# Patient Record
Sex: FEMALE
[De-identification: names, ages, dates, MRNs, and addresses within clinical notes are randomized per-mention and may not be internally consistent; named-entity substitution may affect disease eponyms.]

---

## 2017-07-19 ENCOUNTER — HOSPITAL ENCOUNTER (EMERGENCY)
Dept: HOSPITAL 42 - ED | Age: 52
Discharge: HOME | End: 2017-07-19
Payer: OTHER GOVERNMENT

## 2017-07-19 VITALS — SYSTOLIC BLOOD PRESSURE: 126 MMHG | HEART RATE: 76 BPM | OXYGEN SATURATION: 99 % | DIASTOLIC BLOOD PRESSURE: 77 MMHG

## 2017-07-19 VITALS — TEMPERATURE: 97.8 F | RESPIRATION RATE: 16 BRPM
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
## 2017-07-19 DIAGNOSIS — I86.8: ICD-10-CM

## 2017-07-19 DIAGNOSIS — S31.821A: Primary | ICD-10-CM
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
## 2017-07-19 NOTE — ED PDOC
Arrival/HPI





- General


Time Seen by Provider: 07/19/17 04:19


Historian: Patient





- History of Present Illness


Narrative History of Present Illness (Text): 





07/19/17 04:26


Paris Price is a 52 year old female who presents to the emergency 

department complaining of bleeding from ruptured vericose vein in upper left leg

/buttock region. States she was using the bathroom before going to bed, when 

the vericose vein ruptured. States this has happened before, but was able to 

control the bleeding on her own. However, reports that she was unable to stop 

bleeding today. Denies any fever, chills, headache, dizziness, chest pain, 

shortness of breath, nausea, vomiting, diarrhea, or any other complaints at 

this time. 





Time/Duration: Prior to Arrival


Symptom Onset: Sudden


Symptom Course: Unchanged


Activities at Onset: Light


Context: Home





Past Medical History





- Provider Review


Nursing Documentation Reviewed: Yes





Family/Social History





- Physician Review


Nursing Documentation Reviewed: Yes


Family/Social History: No Known Family HX





Allergies/Home Meds


Allergies/Adverse Reactions: 


Allergies





No Known Allergies Allergy (Verified 07/19/17 04:30)


 








Home Medications: 


 Home Meds











 Medication  Instructions  Recorded  Confirmed


 


No Known Home Med  07/19/17 07/19/17














Review of Systems





- Physician Review


All systems were reviewed & negative as marked: Yes





- Review of Systems


Constitutional: Normal.  absent: Fatigue, Fevers


Respiratory: Normal.  absent: SOB, Cough, Sputum


Cardiovascular: Normal.  absent: Chest Pain, Palpitations


Gastrointestinal: Normal.  absent: Abdominal Pain, Diarrhea, Nausea, Vomiting


Musculoskeletal: Other (bleeding ruptured vericose vein ).  absent: Arthralgias

, Back Pain


Skin: Normal.  absent: Rash, Pruritis


Neurological: Normal.  absent: Headache, Dizziness


Psychiatric: Normal





Physical Exam


Vital Signs Reviewed: Yes


Vital Signs











  Temp Pulse Resp BP Pulse Ox


 


 07/19/17 05:50   76  16  126/77  99


 


 07/19/17 03:58  97.8 F  82  16  130/77  97











Temperature: Afebrile


Blood Pressure: Normal


Pulse: Regular


Respiratory Rate: Normal


Appearance: Positive for: Well-Appearing, Non-Toxic, Comfortable


Pain Distress: None


Mental Status: Positive for: Alert and Oriented X 3





- Systems Exam


Head: Present: Atraumatic, Normocephalic


Pupils: Present: PERRL


Conjunctiva: Present: Normal


Mouth: Present: Moist Mucous Membranes


Respiratory/Chest: Present: Clear to Auscultation, Good Air Exchange.  No: 

Respiratory Distress, Accessory Muscle Use


Cardiovascular: Present: Regular Rate and Rhythm, Normal S1, S2.  No: Murmurs


Abdomen: Present: Normal Bowel Sounds.  No: Tenderness, Distention, Peritoneal 

Signs


Back: Present: Normal Inspection


Upper Extremity: Present: Normal Inspection.  No: Cyanosis, Edema


Lower Extremity: Present: NORMAL PULSES, Neurovascularly Intact, Other (

bleeding varices on rt buttock ).  No: Edema, CALF TENDERNESS


Neurological: Present: GCS=15, CN II-XII Intact, Speech Normal, Motor Func 

Grossly Intact, Normal Sensory Function


Skin: Present: Warm, Dry, Normal Color.  No: Rashes


Psychiatric: Present: Alert, Oriented x 3, Normal Insight, Normal Concentration





Medical Decision Making


ED Course and Treatment: 





07/19/17 04:38


Impression: A 52 year old female who presents to the emergency department 

complaining of bleeding from ruptured vericose vein. 








Plan:





-- Reassess and disposition








Progress Notes:





07/19/17 05:10


PROCEDURE: LACERATION REPAIR


Performed by the emergency provider


Location: Left Buttock 


Description: clean wound edges, no foreign bodies


Distal CMS: Normal. No deficits. Neurovascularly intact.


Anesthesia: Lidocaine 1%


Preparation: The area was prepped and draped in the usual sterile fashion.


Exploration: The wound was explored and no foreign bodies were found.


Procedure: The wound was closed with  5-0 prolene. There was good 

approximation. In total, 2 stitches were used.


Post-Procedure: Good closure and hemostasis. The patient tolerated the 

procedure well and there


were no complications. CSM remains intact. Post procedure dressing applied.











- Scribe Statement


The provider has reviewed the documentation as recorded by the Raphaele


Chris Grullon 


Provider Attestation: 





Provider Scribe Attestation:


All medical record entries made by the Scribheidi were at my direction and 

personally dictated by me. I have reviewed the chart and agree that the record 

accurately reflects my personal performance of the history, physical exam, 

medical decision making, and the department course for this patient. I have 

also personally directed, reviewed, and agree with the discharge instructions 

and disposition.








Disposition/Present on Arrival





- Present on Arrival


Any Indicators Present on Arrival: No





- Disposition


Have Diagnosis and Disposition been Completed?: Yes


Diagnosis: 


 Ruptured varicose vein, Laceration





Disposition: HOME/ ROUTINE


Disposition Time: 05:30


Condition: GOOD


Discharge Instructions (ExitCare):  Laceration (ED), Varicose Veins (ED)


Additional Instructions: 


suture removal in 7 days


Referrals: 


Victor Hugo Angeles DO [Primary Care Provider] - Follow up with primary

## 2017-07-31 ENCOUNTER — HOSPITAL ENCOUNTER (EMERGENCY)
Dept: HOSPITAL 42 - ED | Age: 52
LOS: 1 days | Discharge: HOME | End: 2017-08-01
Payer: OTHER GOVERNMENT

## 2017-07-31 VITALS
DIASTOLIC BLOOD PRESSURE: 62 MMHG | SYSTOLIC BLOOD PRESSURE: 134 MMHG | OXYGEN SATURATION: 95 % | RESPIRATION RATE: 17 BRPM | TEMPERATURE: 98.3 F | HEART RATE: 84 BPM
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
## 2017-07-31 DIAGNOSIS — S71.111A: Primary | ICD-10-CM

## 2017-08-01 NOTE — ED PDOC
Arrival/HPI





- General


Historian: Patient





<Vinicio Ny - Last Filed: 08/01/17 00:57>





<Eliecer Irene - Last Filed: 08/01/17 01:28>





- General


Chief Complaint: Lower Extremity Problem/Injury


Time Seen by Provider: 08/01/17 00:49





- History of Present Illness


Narrative History of Present Illness (Text): 





08/01/17 00:57


53 y/o female, last tetanus under 4 years ago, post menopausal, c/o skin tear x 

1 hour and need suture removal on the previous suture on the rt. thigh x 8 

days.  pt. stated that she had 2 sutures on the rt. thigh which suppose to come 

out today.  Pt. was sitting down tonight, bending and feel mild skin tear on 

the rt. thigh, started to oozing from the new site, no fever or chills, no 

headache or night sweat, no numbness or tingling, no other medical or 

psychological complaints. 


 (Vinicio Ny)





Past Medical History





- Provider Review


Nursing Documentation Reviewed: Yes





- Infectious Disease


Hx of Infectious Diseases: None





- Reproductive


Menopause: Yes





- Endocrine/Metabolic


Hx Hypothyroidism: Yes





- Psychiatric


Hx Substance Use: No





- Surgical History


Hx Tonsillectomy: Yes


Hx Tubal Ligation: Yes





<Vinicio Ny - Last Filed: 08/01/17 00:57>





Family/Social History





- Physician Review


Nursing Documentation Reviewed: Yes


Family/Social History: Unknown Family HX


Smoking Status: Never Smoked


Hx Alcohol Use: Yes


Frequency of alcohol use: Socially


Hx Substance Use: No





<Vinicio Ny - Last Filed: 08/01/17 00:57>





Allergies/Home Meds





<Vinicio Ny - Last Filed: 08/01/17 00:57>





<Eliecer Irene - Last Filed: 08/01/17 01:28>


Allergies/Adverse Reactions: 


Allergies





No Known Allergies Allergy (Verified 07/31/17 23:54)


 








Home Medications: 


 Home Meds











 Medication  Instructions  Recorded  Confirmed


 


No Known Home Med  07/19/17 07/31/17














Review of Systems





- Review of Systems


Constitutional: absent: Fatigue


Eyes: absent: Vision Changes


ENT: absent: Hearing Changes, Rhinorrhea


Respiratory: absent: SOB, Cough


Cardiovascular: absent: Chest Pain


Gastrointestinal: absent: Abdominal Pain, Nausea, Vomiting


Skin: absent: Rash, Pruritis, Skin Lesions


Neurological: absent: Headache, Dizziness





<Vinicio Ny - Last Filed: 08/01/17 00:57>





Physical Exam


Vital Signs Reviewed: Yes


Temperature: Afebrile


Blood Pressure: Normal


Pulse: Regular


Respiratory Rate: Normal


Appearance: Positive for: Well-Appearing, Non-Toxic, Comfortable


Pain Distress: None


Mental Status: Positive for: Alert and Oriented X 3





- Systems Exam


Head: Present: Atraumatic, Normocephalic


Pupils: Present: PERRL


Extroacular Muscles: Present: EOMI


Conjunctiva: Present: Normal


Mouth: Present: Moist Mucous Membranes


Neck: Present: Normal Range of Motion


Respiratory/Chest: Present: Clear to Auscultation, Good Air Exchange.  No: 

Respiratory Distress, Accessory Muscle Use


Cardiovascular: Present: Regular Rate and Rhythm, Normal S1, S2.  No: Murmurs


Abdomen: Present: Normal Bowel Sounds.  No: Tenderness, Distention, Peritoneal 

Signs


Back: Present: Normal Inspection


Upper Extremity: Present: Normal Inspection.  No: Cyanosis, Edema


Lower Extremity: Present: Normal Inspection, Other (there is visible 2 sutures 

with the previous varicose vein completely healed.  There is new mild skin very 

superficial tear less than 0.3cm noted with mild oozing, no cellulitis or 

streaking, no ulcers. ).  No: Edema


Neurological: Present: GCS=15, Speech Normal, Motor Func Grossly Intact, Gait 

Normal, Memory Normal


Skin: Present: Warm, Dry, Normal Color.  No: Rashes


Psychiatric: Present: Alert, Oriented x 3, Normal Insight, Normal Concentration





<Vinicio Ny - Last Filed: 08/01/17 00:57>





Medical Decision Making





<Vinicio Ny - Last Filed: 08/01/17 00:57>





<Eliecer Irene - Last Filed: 08/01/17 01:28>


ED Course and Treatment: 





08/01/17 01:06


-2 sutures removed as it is time to come out with no remaining suture


-new skin tear site irrigated with the normal saline, clean with betadine, 

dermabond and sterile strip with the wound completely stop bleeding.


-Discharge home with education on follow up with your own pmd within 2 days, 

avoid bending or squatting, return to the ER for any new or worsening signs or 

symptoms.  (Vinicio Ny)





- PA / NP / Resident Statement


MD/DO has reviewed & agrees with the documentation as recorded.





<Vinicio Ny - Last Filed: 08/01/17 00:57>





- PA / NP / Resident Statement


GURPREET has reviewed & agrees with the documentation as recorded.


GURPREET has examined the patient and agrees with the treatment plan.





<Eliecer Irene - Last Filed: 08/01/17 01:28>





Disposition/Present on Arrival





- Present on Arrival


Any Indicators Present on Arrival: No


History of DVT/PE: No


History of Uncontrolled Diabetes: No


Urinary Catheter: No


History of Decub. Ulcer: No


History Surgical Site Infection Following: None





- Disposition


Have Diagnosis and Disposition been Completed?: Yes


Disposition Time: 01:07


Patient Plan: Discharge





<Vinicio Ny - Last Filed: 08/01/17 00:57>





<Eliecer Irene - Last Filed: 08/01/17 01:28>





- Disposition


Diagnosis: 


 Skin tear





Disposition: HOME/ ROUTINE


Condition: GOOD


Additional Instructions: 


-Discharge home with education on follow up with your own pmd within 2 days, 

avoid bending or squatting, return to the ER for any new or worsening signs or 

symptoms. 


Referrals: 


[facility] [place] [Outside] - Follow up with primary


Forms:  [employer] (English)

## 2018-03-26 ENCOUNTER — HOSPITAL ENCOUNTER (EMERGENCY)
Dept: HOSPITAL 42 - ED | Age: 53
Discharge: HOME | End: 2018-03-26
Payer: COMMERCIAL

## 2018-03-26 VITALS — TEMPERATURE: 97.9 F

## 2018-03-26 VITALS
OXYGEN SATURATION: 100 % | HEART RATE: 69 BPM | SYSTOLIC BLOOD PRESSURE: 122 MMHG | RESPIRATION RATE: 18 BRPM | DIASTOLIC BLOOD PRESSURE: 64 MMHG

## 2018-03-26 DIAGNOSIS — M25.572: Primary | ICD-10-CM

## 2018-03-26 NOTE — ED PDOC
Arrival/HPI





- General


Chief Complaint: Lower Extremity Problem/Injury


Time Seen by Provider: 03/26/18 11:20


Historian: Patient





- History of Present Illness


Narrative History of Present Illness (Text): 





03/26/18 11:30





Paris Price is a 53 year old female who presents to the emergency 

department complaining of left ankle pain for 4 days. Patient states that she 

was sitting and rotating on a chair when she heard an audible snap from her 

left ankle. Patient notes that she is able to ambulate without pain. No other 

complaints offered at this time.





Time/Duration: < week


Symptom Course: Unchanged


Activities at Onset: Light


Context: Home





Past Medical History





- Provider Review


Nursing Documentation Reviewed: Yes





- Infectious Disease


Hx of Infectious Diseases: None





- Reproductive


Menopause: Yes





- Cardiac


Hx Cardiac Disorders: No





- Pulmonary


Hx Respiratory Disorders: No





- Neurological


Hx Neurological Disorder: No





- HEENT


Hx HEENT Disorder: No





- Renal


Hx Renal Disorder: No





- Endocrine/Metabolic


Hx Endocrine Disorders: Yes


Hx Hypothyroidism: Yes





- Hematological/Oncological


Hx Blood Disorders: No





- Musculoskeletal/Rheumatological


Hx Musculoskeletal Disorders: No





- Gastrointestinal


Hx Gastrointestinal Disorders: No





- Genitourinary/Gynecological


Hx Genitourinary Disorders: No





- Psychiatric


Hx Psychophysiologic Disorder: No


Hx Substance Use: No





- Surgical History


Hx Tonsillectomy: Yes


Hx Tubal Ligation: Yes





Family/Social History





- Physician Review


Nursing Documentation Reviewed: Yes


Family/Social History: No Known Family HX


Smoking Status: Never Smoked


Hx Alcohol Use: Yes


Frequency of alcohol use: Socially


Hx Substance Use: No





Allergies/Home Meds


Allergies/Adverse Reactions: 


Allergies





No Known Allergies Allergy (Verified 03/26/18 10:51)


 











Review of Systems





- Physician Review


All systems were reviewed & negative as marked: Yes





- Review of Systems


Constitutional: absent: Fevers, Night Sweats


Eyes: absent: Vision Changes


ENT: absent: Hearing Changes


Respiratory: absent: SOB, Cough


Cardiovascular: absent: Chest Pain


Gastrointestinal: absent: Abdominal Pain


Genitourinary Female: absent: Dysuria, Frequency


Musculoskeletal: Other (Left ankle pain)


Skin: absent: Rash, Pruritis


Neurological: absent: Headache


Endocrine: absent: Diaphoresis


Hemo/Lymphatic: absent: Adenopathy


Psychiatric: absent: Anxiety, Depression





Physical Exam


Vital Signs Reviewed: Yes


Vital Signs











  Temp Pulse Resp BP Pulse Ox


 


 03/26/18 12:14   69  18  122/64  100


 


 03/26/18 10:51  97.9 F  77  16  124/67  99











Temperature: Afebrile


Blood Pressure: Normal


Pulse: Regular


Respiratory Rate: Normal


Appearance: Positive for: Well-Appearing, Non-Toxic, Comfortable


Pain Distress: None


Mental Status: Positive for: Alert and Oriented X 3





- Systems Exam


Head: Present: Atraumatic, Normocephalic


Pupils: Present: PERRL


Extroacular Muscles: Present: EOMI


Conjunctiva: Present: Normal


Mouth: Present: Moist Mucous Membranes


Neck: Present: Normal Range of Motion


Respiratory/Chest: Present: Clear to Auscultation, Good Air Exchange.  No: 

Respiratory Distress, Accessory Muscle Use


Cardiovascular: Present: Regular Rate and Rhythm, Normal S1, S2.  No: Murmurs


Abdomen: Present: Normal Bowel Sounds.  No: Tenderness, Distention, Peritoneal 

Signs


Back: Present: Normal Inspection


Upper Extremity: Present: Normal Inspection.  No: Cyanosis, Edema


Lower Extremity: Present: NORMAL PULSES, Tenderness (Diffuse lateral ankle 

tenderness), Other (achilles tendon intact).  No: CALF TENDERNESS


Neurological: Present: GCS=15, CN II-XII Intact, Speech Normal


Skin: Present: Warm, Dry, Normal Color.  No: Rashes


Psychiatric: Present: Alert, Oriented x 3, Normal Insight, Normal Concentration





Medical Decision Making


ED Course and Treatment: 





03/26/18 11:30


Impression:





53 year old female complaining of left ankle pain for 4 days. 





Plan:


--Discharge





Prior Visits:


Notes and results from previous visits were reviewed. Patient was last seen in 

the emergency department on 08/01/17 for skin tear and need suture removal on 

the previous suture on the rt. thigh. Patient was discharged home. 





Progress Notes:








- RAD Interpretation


Radiology Orders: 








03/26/18 11:20


ANKLE LEFT 3 VIEWS ROUTINE [RAD] Stat 














- Scribe Statement


The provider has reviewed the documentation as recorded by the Cecilia Nur





Provider Scribe Attestation:


All medical record entries made by the Scribe were at my direction and 

personally dictated by me. I have reviewed the chart and agree that the record 

accurately reflects my personal performance of the history, physical exam, 

medical decision making, and the department course for this patient. I have 

also personally directed, reviewed, and agree with the discharge instructions 

and disposition.





Disposition/Present on Arrival





- Present on Arrival


Any Indicators Present on Arrival: No


History of DVT/PE: No


History of Uncontrolled Diabetes: No


Urinary Catheter: No


History of Decub. Ulcer: No


History Surgical Site Infection Following: None





- Disposition


Have Diagnosis and Disposition been Completed?: Yes


Diagnosis: 


 Ankle pain





Disposition: HOME/ ROUTINE


Disposition Time: 12:00


Condition: GOOD


Discharge Instructions (ExitCare):  Ankle Sprain (DC)


Additional Instructions: 





Thank you for letting us take care of you today. The emergency medical care you 

received today was directed at your acute symptoms. If you were prescribed any 

medication, please fill it and take as directed. It may take several days for 

your symptoms to resolve. Return to the Emergency Department if your symptoms 

worsen, do not improve, or if you have any other problems.





Please contact your doctor or call one of the physicians/clinics you have been 

referred to that are listed on the Patient Visit Information form that is 

included in your discharge packet. Bring any paperwork you were given at 

discharge with you along with any medications you are taking to your follow up 

visit. Our treatment cannot replace ongoing medical care by a primary care 

provider (PCP) outside of the emergency department.





Thank you for allowing the [facility] team to be part of your care today.

















Please follow up with the podiatrist this week for re-evaluation and further 

management.


Prescriptions: 


Naproxen [Naprosyn] 500 mg PO Q12 PRN #20 tablet


 PRN Reason: Pain, Moderate (4-7)


Referrals: 


Roni Alegre DPM [Staff Provider] - Follow up with primary


Forms:  [employer] (English)

## 2018-03-26 NOTE — RAD
PROCEDURE:  Left Ankle Radiographs.



HISTORY:

Lateral ankle tenderness  



COMPARISON:

None



FINDINGS:



BONES:

Bone alignment is normal.  There is mild diffuse bone 

demineralization.  There is no acute displaced fracture or bone 

destruction.  There is a prominent plantar calcaneal spur.  There is 

an os trigonum. 



JOINTS:

The joint spaces are preserved. Ankle mortise maintained. Talar dome 

intact



SOFT TISSUES:

There is mild periarticular soft tissue swelling. 



OTHER FINDINGS:

None.



IMPRESSION:

No acute fracture or dislocation. Mild periarticular soft tissue 

swelling.